# Patient Record
Sex: FEMALE | Race: WHITE | NOT HISPANIC OR LATINO | URBAN - METROPOLITAN AREA
[De-identification: names, ages, dates, MRNs, and addresses within clinical notes are randomized per-mention and may not be internally consistent; named-entity substitution may affect disease eponyms.]

---

## 2024-10-09 ENCOUNTER — HOSPITAL ENCOUNTER (OUTPATIENT)
Dept: RADIOLOGY | Facility: HOSPITAL | Age: 69
Discharge: HOME | End: 2024-10-09
Attending: UROLOGY
Payer: MEDICARE

## 2024-10-09 ENCOUNTER — TELEPHONE (OUTPATIENT)
Dept: UROLOGY | Facility: CLINIC | Age: 69
End: 2024-10-09
Payer: MEDICARE

## 2024-10-09 DIAGNOSIS — N39.0 RECURRENT UTI: ICD-10-CM

## 2024-10-09 DIAGNOSIS — N31.9 NEUROGENIC BLADDER: ICD-10-CM

## 2024-10-09 PROCEDURE — 76770 US EXAM ABDO BACK WALL COMP: CPT | Mod: 26,,, | Performed by: RADIOLOGY

## 2024-10-09 PROCEDURE — 76770 US EXAM ABDO BACK WALL COMP: CPT | Mod: TC

## 2024-10-09 NOTE — TELEPHONE ENCOUNTER
----- Message from Med Assistant Harris sent at 10/9/2024  1:36 PM CDT -----  Regarding: FW: self    ----- Message -----  From: Sreekanth Jimenez  Sent: 10/9/2024  12:47 PM CDT  To: Maninder Chaidez Staff  Subject: self                                             Type: Patient Call Back    Who called:self    What is the request in detail:pt is calling to speak to the office about getting in sooner for appt or something to help take care it today. Her UTI has returned     Can the clinic reply by [facility]?no    Would the patient rather a call back or a response via [facility]? callback    Best call back number:[phone]    Additional Information:

## 2024-10-09 NOTE — TELEPHONE ENCOUNTER
I can see her tomorrow in clinic if she would like to be seen (currently with 1pm and 2:40pm opening)

## 2024-10-10 ENCOUNTER — OFFICE VISIT (OUTPATIENT)
Dept: UROLOGY | Facility: CLINIC | Age: 69
End: 2024-10-10
Payer: MEDICARE

## 2024-10-10 VITALS — WEIGHT: 184.88 LBS

## 2024-10-10 DIAGNOSIS — N39.0 RECURRENT UTI: Primary | ICD-10-CM

## 2024-10-10 DIAGNOSIS — N31.9 NEUROGENIC BLADDER: ICD-10-CM

## 2024-10-10 DIAGNOSIS — R33.9 URINARY RETENTION: ICD-10-CM

## 2024-10-10 PROCEDURE — 87088 URINE BACTERIA CULTURE: CPT | Performed by: UROLOGY

## 2024-10-10 PROCEDURE — 87086 URINE CULTURE/COLONY COUNT: CPT | Performed by: UROLOGY

## 2024-10-10 PROCEDURE — 99214 OFFICE O/P EST MOD 30 MIN: CPT | Mod: S$PBB,,, | Performed by: UROLOGY

## 2024-10-10 PROCEDURE — 87186 SC STD MICRODIL/AGAR DIL: CPT | Performed by: UROLOGY

## 2024-10-10 PROCEDURE — 99212 OFFICE O/P EST SF 10 MIN: CPT | Mod: PBBFAC | Performed by: UROLOGY

## 2024-10-10 PROCEDURE — 99999 PR PBB SHADOW E&M-EST. PATIENT-LVL II: CPT | Mod: PBBFAC,,, | Performed by: UROLOGY

## 2024-10-10 RX ORDER — ERGOCALCIFEROL 1.25 MG/1
50000 CAPSULE ORAL
COMMUNITY

## 2024-10-10 RX ORDER — OMEPRAZOLE 40 MG/1
40 CAPSULE, DELAYED RELEASE ORAL DAILY
COMMUNITY

## 2024-10-10 RX ORDER — ZOLPIDEM TARTRATE 10 MG/1
10 TABLET ORAL NIGHTLY PRN
COMMUNITY

## 2024-10-10 RX ORDER — METOPROLOL TARTRATE 25 MG/1
25 TABLET, FILM COATED ORAL ONCE
COMMUNITY

## 2024-10-10 RX ORDER — EZETIMIBE 10 MG/1
10 TABLET ORAL DAILY
COMMUNITY

## 2024-10-10 RX ORDER — ESTRADIOL 0.1 MG/G
CREAM VAGINAL
Qty: 42.5 G | Refills: 11 | Status: SHIPPED | OUTPATIENT
Start: 2024-10-10 | End: 2025-10-23

## 2024-10-10 RX ORDER — LEVOTHYROXINE SODIUM 125 UG/1
125 TABLET ORAL
COMMUNITY

## 2024-10-10 RX ORDER — ROSUVASTATIN CALCIUM 10 MG/1
10 TABLET, COATED ORAL DAILY
COMMUNITY

## 2024-10-10 NOTE — PROGRESS NOTES
Subjective:       Theresa Jasmine is a 69 y.o. female who is a new patient who was seen  for evaluation of UTIs.      States she was referred by PCP. No referral received.     Reports rUTIs. No UCx records in [facility]. UA in 2021 was normal. Last saw urology 4-5 years ago. Saw Dr Mercado 15 years ago - had extensive testing previously including urodynamics. Saw neurology, etc.     States she was treated for UTI x 2 recently from PCP. Given Macrobid then Cipro. UTI symptoms - general malaise, mental cloudiness. Denies dysuria, urgency. Given 2nd round of abx due to irritation/frequency though appears f/u culture was negative.    She has chronic UR x 17 years, relies on CIC. CIC 5x daily - 14Fr. Can void some independently. Sister also with similar UR issues - unknown cause. No prior issues with [place].     PVR (bladder scan) today - 24cc (after CIC)    CT a/p 2/2018 (report only) - mild extrarenal pelvis dilation on L, possible mild UPJO. Small L renal cyst. Large fecal burden.     JAIME 10/24 - no mass/hydro. Two possible calcifications in LLP. 1.6cm LUP cyst, 9cc PVR.     10/10/2024  Returns now with UTI symptoms. Treated for UTI last month. Notes much less voided urine, draining much more with catheter. Notes RLQ worse with bladder being full with more dysuria. Denies constipation/diarrhea.     UCx:  7/22/24 - >100k Citrobacter (sensitivities not available)  8/5/24 - neg  9/24 - >100k Citrobacter      The following portions of the patient's history were reviewed and updated as appropriate: allergies, current medications, past family history, past medical history, past social history, past surgical history and problem list.    Review of Systems  12 point review of systems completed. Pertinent positive and negatives listed in HPI        Objective:    Vitals: Wt 83.8 kg (184 lb 13.7 oz)     Physical Exam   General: well developed, well nourished in no acute distress  Head: normocephalic, atraumatic  Neck:  no obvious enlargement of thyroid  HEENT: EOMI, mucus membranes moist, sclera anicteric, no hearing impairment  Lungs: symmetric expansion, non-labored breathing  Neuro: alert and oriented x 3, no gross deficits  Psych: normal judgment and insight, normal mood/affect and non-anxious  Genitourinary: deferred      Lab Review   Urine analysis today in clinic shows positive for ++LE, trace glucose     Lab Results   Component Value Date    WBC 0-3 08/05/2024     Lab Results   Component Value Date    CREATININE 0.79 07/09/2024    BUN 18 07/09/2024         Imaging  Images and reports were personally reviewed by me and discussed with patient  CT report reviewed, JAIME reviewed       Assessment/Plan:      1. Recurrent UTI     - Recent Citrobacter UTI (sensitivities not seen on pt's portal on her phone). Follow up UCx was negative.    - CIC will cause bladder colonization. Only treat for UTI with symptoms.    - UCx again today due to symptoms   - JAIME - no obvious pathology, PVR low   - Recommend cystoscopy    - Would benefit from vaginal estrogen - recommend, ordered. Instructed on use.      2. Neurogenic bladder     - CIC 5x daily. 14Fr catheters.    - Recommend JAIME for upper tract monitoring - no hydro   - Prior extensive workup.    - Sister also with UR/CIC reliant     3. Urinary retention    - Cont CIC       Follow up for cystoscopy

## 2024-10-12 LAB — BACTERIA UR CULT: ABNORMAL

## 2024-10-14 ENCOUNTER — TELEPHONE (OUTPATIENT)
Dept: UROLOGY | Facility: CLINIC | Age: 69
End: 2024-10-14
Payer: MEDICARE

## 2024-10-14 RX ORDER — CEPHALEXIN 500 MG/1
500 CAPSULE ORAL EVERY 8 HOURS
Qty: 21 CAPSULE | Refills: 0 | Status: SHIPPED | OUTPATIENT
Start: 2024-10-14 | End: 2024-10-21

## 2024-10-14 NOTE — TELEPHONE ENCOUNTER
Patient has been contacted & informed of results     ----- Message from Kaylynn Dickens MD sent at 10/14/2024  9:23 AM CDT -----  Please inform patient of urinary tract infection on recent urine culture. Appropriate antibiotics (Keflex) were sent in to the pharmacy.

## 2024-11-14 ENCOUNTER — PROCEDURE VISIT (OUTPATIENT)
Dept: UROLOGY | Facility: CLINIC | Age: 69
End: 2024-11-14
Payer: MEDICARE

## 2024-11-14 VITALS — WEIGHT: 180.75 LBS

## 2024-11-14 DIAGNOSIS — N31.9 NEUROGENIC BLADDER: ICD-10-CM

## 2024-11-14 DIAGNOSIS — N39.0 RECURRENT UTI: ICD-10-CM

## 2024-11-14 PROCEDURE — 52000 CYSTOURETHROSCOPY: CPT | Mod: PBBFAC | Performed by: UROLOGY

## 2024-11-14 NOTE — PROCEDURES
Cystoscopy    Date/Time: 11/14/2024 2:20 PM    Performed by: Kaylynn Dickens MD  Authorized by: Kaylynn Dickens MD    Consent Done?:  Yes (Written)  Timeout: prior to procedure the correct patient, procedure, and site was verified    Prep: patient was prepped and draped in usual sterile fashion    Anesthesia:  Lidocaine jelly  Indications: recurrent UTIs    Indications comment:  UR  Position:  Dorsal lithotomy  Anesthesia:  Lidocaine jelly  Patient sedated?: No    Preparation: Patient was prepped and draped in usual sterile fashion    Scope type:  Flexible cystoscope  Stent inserted: No    Stent removed: No    External exam normal: Yes    Digital exam performed: No    Urethra normal: Yes    Bladder neck normal: Yes    Bladder normal: Yes     patient tolerated the procedure well with no immediate complications  Comments:        Normal cystoscopy. UOs normal.

## 2024-11-21 ENCOUNTER — TELEPHONE (OUTPATIENT)
Dept: DERMATOLOGY | Facility: CLINIC | Age: 69
End: 2024-11-21
Payer: MEDICARE

## 2024-11-21 NOTE — TELEPHONE ENCOUNTER
Appointment has been made per pt request   ----- Message from Corrine sent at 2024  1:22 PM CST -----  Regarding: no availability  Contact: Pt @[phone]  Pt called in to schedule an appt; no available appts in Epic. Pt is asking for a call back soon to schedule. Thanks.             Patient's DX: Psoriasis  on scalp and neck         Patient requesting call back or [facility] [phone]

## 2025-01-07 ENCOUNTER — OFFICE VISIT (OUTPATIENT)
Dept: DERMATOLOGY | Facility: CLINIC | Age: 70
End: 2025-01-07
Payer: MEDICARE

## 2025-01-07 DIAGNOSIS — L21.9 SEBORRHEIC DERMATITIS: Primary | ICD-10-CM

## 2025-01-07 PROCEDURE — 99203 OFFICE O/P NEW LOW 30 MIN: CPT | Mod: S$GLB,,, | Performed by: DERMATOLOGY

## 2025-01-07 PROCEDURE — G2211 COMPLEX E/M VISIT ADD ON: HCPCS | Mod: S$GLB,,, | Performed by: DERMATOLOGY

## 2025-01-07 RX ORDER — FLUOCINONIDE TOPICAL SOLUTION USP, 0.05% 0.5 MG/ML
SOLUTION TOPICAL
Qty: 60 ML | Refills: 3 | Status: SHIPPED | OUTPATIENT
Start: 2025-01-07

## 2025-01-07 NOTE — PROGRESS NOTES
Patient Information  Name: Theresa Jasmine  : 1955  MRN: [id number]     Referring Physician:  Self   Primary Care Physician:  Rebeca, Primary Doctor   Date of Visit: 25      Subjective:     History of Present lllness:    Theresa Jasmine is a 69 y.o. female who presents with a chief complaint of rash.  Location: scalp  Duration: years  Signs/Symptoms: crusting, itchy, burning. Flares up about 4-5 times a year and will last for at least two weeks.   Exacerbating factors: none  Relieving factors/Prior treatments: OTC dandruff shampoo--has helped it    Clinical documentation obtained by nursing staff reviewed.    Review of Systems    Objective:   Physical Exam   Constitutional: She appears well-developed and well-nourished. No distress.   Neurological: She is alert and oriented to person, place, and time. She is not disoriented.   Psychiatric: She has a normal mood and affect.   Skin:   Areas Examined (abnormalities noted in diagram):   Scalp / Hair Palpated and Inspected            Diagram Legend     Erythematous scaling macule/papule c/w actinic keratosis       Vascular papule c/w angioma      Pigmented verrucoid papule/plaque c/w seborrheic keratosis      Yellow umbilicated papule c/w sebaceous hyperplasia      Irregularly shaped tan macule c/w lentigo     1-2 mm smooth white papules consistent with Milia      Movable subcutaneous cyst with punctum c/w epidermal inclusion cyst      Subcutaneous movable cyst c/w pilar cyst      Firm pink to brown papule c/w dermatofibroma      Pedunculated fleshy papule(s) c/w skin tag(s)      Evenly pigmented macule c/w junctional nevus     Mildly variegated pigmented, slightly irregular-bordered macule c/w mildly atypical nevus      Flesh colored to evenly pigmented papule c/w intradermal nevus       Pink pearly papule/plaque c/w basal cell carcinoma      Erythematous hyperkeratotic cursted plaque c/w SCC      Surgical scar with no sign of skin cancer recurrence      Open  and closed comedones      Inflammatory papules and pustules      Verrucoid papule consistent consistent with wart     Erythematous eczematous patches and plaques     Dystrophic onycholytic nail with subungual debris c/w onychomycosis     Umbilicated papule    Erythematous-base heme-crusted tan verrucoid plaque consistent with inflamed seborrheic keratosis     Erythematous Silvery Scaling Plaque c/w Psoriasis     See annotation    No images are attached to the encounter or orders placed in the encounter.      [] Data reviewed  [] Prior external notes reviewed  [] Independent review of test  [] Management discussed with another provider  [] Independent historian    Assessment / Plan:        Seborrheic dermatitis   - stable and chronic  Continue OTC dandruff shampoo.  For flares:  -     fluocinonide (LIDEX) 0.05 % external solution; Apply to affected areas of scalp qday - bid prn scaling or itching.  Dispense: 60 mL; Refill: 3  Side effects from the overuse of topical steroids include thinning of skin, easy tearing/bruising of skin, stretch marks, spider veins, etc. Use the topical steroid no more than 2 days per week if used long-term and/or take breaks from the topical steroid, especially if any of the above side effects are noticed.      Follow up if symptoms worsen or fail to improve.      Lanette Almeida MD, FAAD  [facility]

## 2025-05-08 ENCOUNTER — OFFICE VISIT (OUTPATIENT)
Dept: UROLOGY | Facility: CLINIC | Age: 70
End: 2025-05-08
Payer: MEDICARE

## 2025-05-08 VITALS — WEIGHT: 167.31 LBS

## 2025-05-08 DIAGNOSIS — N39.0 RECURRENT UTI: Primary | ICD-10-CM

## 2025-05-08 DIAGNOSIS — R33.9 URINARY RETENTION: ICD-10-CM

## 2025-05-08 DIAGNOSIS — N31.9 NEUROGENIC BLADDER: ICD-10-CM

## 2025-05-08 PROCEDURE — 87186 SC STD MICRODIL/AGAR DIL: CPT | Performed by: UROLOGY

## 2025-05-08 PROCEDURE — 99999 PR PBB SHADOW E&M-EST. PATIENT-LVL III: CPT | Mod: PBBFAC,,, | Performed by: UROLOGY

## 2025-05-08 PROCEDURE — 99213 OFFICE O/P EST LOW 20 MIN: CPT | Mod: PBBFAC | Performed by: UROLOGY

## 2025-05-08 PROCEDURE — 99214 OFFICE O/P EST MOD 30 MIN: CPT | Mod: S$PBB,,, | Performed by: UROLOGY

## 2025-05-08 NOTE — PROGRESS NOTES
Subjective:       Theresa Jasmine is a 70 y.o. female who is a new patient who was seen  for evaluation of UTIs.      States she was referred by PCP. No referral received.     Reports rUTIs. No UCx records in [facility]. UA in 2021 was normal. Last saw urology 4-5 years ago. Saw Dr Mercado 15 years ago - had extensive testing previously including urodynamics. Saw neurology, etc.     States she was treated for UTI x 2 recently from PCP. Given Macrobid then Cipro. UTI symptoms - general malaise, mental cloudiness. Denies dysuria, urgency. Given 2nd round of abx due to irritation/frequency though appears f/u culture was negative.    She has chronic UR x 17 years, relies on CIC. CIC 5x daily - 14Fr. Can void some independently. Sister also with similar UR issues - unknown cause. No prior issues with [place].     PVR (bladder scan) today - 24cc (after CIC)    CT a/p 2/2018 (report only) - mild extrarenal pelvis dilation on L, possible mild UPJO. Small L renal cyst. Large fecal burden.     JAIME 10/24 - no mass/hydro. Two possible calcifications in LLP. 1.6cm LUP cyst, 9cc PVR.     Cysto 11/24 - Normal cystoscopy. UOs normal.     10/10/2024  Returns now with UTI symptoms. Treated for UTI last month. Notes much less voided urine, draining much more with catheter. Notes RLQ worse with bladder being full with more dysuria. Denies constipation/diarrhea.     5/8/2025  Notes mild dysuria x 2 days. Not using Estrace cream.     UCx:  7/22/24 - >100k Citrobacter (sensitivities not available)  8/5/24 - neg  9/24 - >100k Citrobacter  10/24 - >100k E coli      The following portions of the patient's history were reviewed and updated as appropriate: allergies, current medications, past family history, past medical history, past social history, past surgical history and problem list.    Review of Systems  12 point review of systems completed. Pertinent positive and negatives listed in HPI        Objective:    Vitals: Wt 75.9 kg  (167 lb 5.3 oz)     Physical Exam   General: well developed, well nourished in no acute distress  Head: normocephalic, atraumatic  Neck: no obvious enlargement of thyroid  HEENT: EOMI, mucus membranes moist, sclera anicteric, no hearing impairment  Lungs: symmetric expansion, non-labored breathing  Neuro: alert and oriented x 3, no gross deficits  Psych: normal judgment and insight, normal mood/affect and non-anxious  Genitourinary: deferred      Lab Review   Urine analysis today in clinic shows positive for +nitrite, trace protein     Lab Results   Component Value Date    WBC 0-3 08/05/2024     Lab Results   Component Value Date    CREATININE 0.70 01/18/2025    BUN 21 01/18/2025    BUN 18.2 04/18/2024         Imaging  Images and reports were personally reviewed by me and discussed with patient  CT report reviewed, JAIME reviewed       Assessment/Plan:      1. Recurrent UTI     - Recent Citrobacter UTI (sensitivities not seen on pt's portal on her phone). Follow up UCx was negative.    - CIC will cause bladder colonization. Only treat for UTI with symptoms.    - UCx again today due to symptoms   - JAIME - no obvious pathology, PVR low   - Cystoscopy - negative   - Would benefit from vaginal estrogen - recommend, ordered previously. Instructed on use.  Discussed importance of this to reduce UTI risk.    - UCx today. Offered empiric abx, prefers to await culture.      2. Neurogenic bladder     - CIC 5x daily. 14Fr catheters.    - Recommend JAIME for upper tract monitoring - no hydro   - Prior extensive workup.    - Sister also with UR/CIC reliant     3. Urinary retention    - Cont CIC       Follow up for 6 months

## 2025-05-10 ENCOUNTER — RESULTS FOLLOW-UP (OUTPATIENT)
Dept: UROLOGY | Facility: HOSPITAL | Age: 70
End: 2025-05-10

## 2025-05-10 LAB — BACTERIA UR CULT: ABNORMAL

## 2025-05-10 RX ORDER — CEPHALEXIN 500 MG/1
500 CAPSULE ORAL EVERY 8 HOURS
Qty: 21 CAPSULE | Refills: 0 | Status: SHIPPED | OUTPATIENT
Start: 2025-05-10 | End: 2025-05-17

## 2025-05-12 ENCOUNTER — TELEPHONE (OUTPATIENT)
Dept: UROLOGY | Facility: CLINIC | Age: 70
End: 2025-05-12
Payer: MEDICARE

## 2025-05-12 NOTE — TELEPHONE ENCOUNTER
----- Message from Kaylynn Dickens MD sent at 5/10/2025  2:09 PM CDT -----  Please inform patient of urinary tract infection on recent urine culture. Appropriate antibiotics (Keflex) were sent in to the pharmacy.    ----- Message -----  From: Lab, Background User  Sent: 5/9/2025   8:47 AM CDT  To: Kaylynn Dickens MD

## 2025-05-14 RX ORDER — ESTRADIOL 0.1 MG/G
1 CREAM VAGINAL
Qty: 85 G | Refills: 3 | Status: SHIPPED | OUTPATIENT
Start: 2025-05-15

## 2025-07-10 ENCOUNTER — PATIENT MESSAGE (OUTPATIENT)
Dept: UROLOGY | Facility: CLINIC | Age: 70
End: 2025-07-10
Payer: MEDICARE

## 2025-07-24 ENCOUNTER — OFFICE VISIT (OUTPATIENT)
Dept: UROLOGY | Facility: CLINIC | Age: 70
End: 2025-07-24
Payer: MEDICARE

## 2025-07-24 VITALS — WEIGHT: 154.88 LBS

## 2025-07-24 DIAGNOSIS — R33.9 URINARY RETENTION: ICD-10-CM

## 2025-07-24 DIAGNOSIS — N31.9 NEUROGENIC BLADDER: ICD-10-CM

## 2025-07-24 DIAGNOSIS — N39.0 RECURRENT UTI: Primary | ICD-10-CM

## 2025-07-24 PROCEDURE — 99213 OFFICE O/P EST LOW 20 MIN: CPT | Mod: PBBFAC | Performed by: UROLOGY

## 2025-07-24 PROCEDURE — 99999 PR PBB SHADOW E&M-EST. PATIENT-LVL III: CPT | Mod: PBBFAC,,, | Performed by: UROLOGY

## 2025-07-24 PROCEDURE — 99214 OFFICE O/P EST MOD 30 MIN: CPT | Mod: S$PBB,,, | Performed by: UROLOGY

## 2025-07-24 RX ORDER — ESTRADIOL 10 UG/1
1 TABLET, FILM COATED VAGINAL
Qty: 30 TABLET | Refills: 11 | Status: SHIPPED | OUTPATIENT
Start: 2025-07-24 | End: 2026-07-24

## 2025-07-24 NOTE — PROGRESS NOTES
Subjective:       Theresa Jasmine is a 70 y.o. female who is a new patient who was seen  for evaluation of UTIs.      States she was referred by PCP. No referral received.     Reports rUTIs. No UCx records in [facility]. UA in 2021 was normal. Last saw urology 4-5 years ago. Saw Dr Mercado 15 years ago - had extensive testing previously including urodynamics. Saw neurology, etc.     States she was treated for UTI x 2 recently from PCP. Given Macrobid then Cipro. UTI symptoms - general malaise, mental cloudiness. Denies dysuria, urgency. Given 2nd round of abx due to irritation/frequency though appears f/u culture was negative.    She has chronic UR x 17 years, relies on CIC. CIC 5x daily - 14Fr. Can void some independently. Sister also with similar UR issues - unknown cause. No prior issues with [place].     PVR (bladder scan) today - 24cc (after CIC)    CT a/p 2/2018 (report only) - mild extrarenal pelvis dilation on L, possible mild UPJO. Small L renal cyst. Large fecal burden.     JAIME 10/24 - no mass/hydro. Two possible calcifications in LLP. 1.6cm LUP cyst, 9cc PVR.     Cysto 11/24 - Normal cystoscopy. UOs normal.     10/10/2024  Returns now with UTI symptoms. Treated for UTI last month. Notes much less voided urine, draining much more with catheter. Notes RLQ worse with bladder being full with more dysuria. Denies constipation/diarrhea.     5/8/2025  Notes mild dysuria x 2 days. Not using Estrace cream.     7/24/2025  Reports continued UTI issues. Was seen at [facility], treated for UTI. She reports UTI symptoms of urethral irritation. Continues to catheterize regularly to drain bladder. Notes Estrace cream causes some burning/messy. Moving to [place] but will back in [facility] regularly.      UCx:  7/22/24 - >100k Citrobacter (sensitivities not available)  8/5/24 - neg  9/24 - >100k Citrobacter  10/24 - >100k E coli  5/25 - >100k E coli (pansens)  7/25 - UA at [facility] with bacteria/squams. No UCx      The following  portions of the patient's history were reviewed and updated as appropriate: allergies, current medications, past family history, past medical history, past social history, past surgical history and problem list.    Review of Systems  12 point review of systems completed. Pertinent positive and negatives listed in HPI        Objective:    Vitals: There were no vitals taken for this visit.    Physical Exam   General: well developed, well nourished in no acute distress  Head: normocephalic, atraumatic  Neck: no obvious enlargement of thyroid  HEENT: EOMI, mucus membranes moist, sclera anicteric, no hearing impairment  Lungs: symmetric expansion, non-labored breathing  Neuro: alert and oriented x 3, no gross deficits  Psych: normal judgment and insight, normal mood/affect and non-anxious  Genitourinary: deferred      Lab Review   Urine analysis today in clinic shows positive for +nitrite, trace protein     Lab Results   Component Value Date    WBC 0-3 08/05/2024     Lab Results   Component Value Date    CREATININE 0.74 07/01/2025    BUN 16 07/01/2025    BUN 4.6 (L) 05/28/2025         Imaging  Images and reports were personally reviewed by me and discussed with patient  CT report reviewed, JAIME reviewed       Assessment/Plan:      1. Recurrent UTI     - Recent Citrobacter UTI (sensitivities not seen on pt's portal on her phone). Follow up UCx was negative.    - CIC will cause bladder colonization. Only treat for UTI with symptoms.    - JAIME - no obvious pathology, PVR low   - Cystoscopy - negative   - Would benefit from vaginal estrogen - recommend, ordered previously. Instructed on use.  Discussed importance of this to reduce UTI risk.    - Again discussed colonization - only treat with symptoms   - D-mannose   - Change to Vagifem to help with burning     2. Neurogenic bladder     - CIC 5x daily. 14Fr catheters.    - Recommend JAIME for upper tract monitoring - no hydro   - Prior extensive workup.    - Sister also with  UR/CIC reliant     3. Urinary retention    - Cont CIC       Follow up for 6 months